# Patient Record
Sex: MALE | Race: WHITE | Employment: UNEMPLOYED | URBAN - METROPOLITAN AREA
[De-identification: names, ages, dates, MRNs, and addresses within clinical notes are randomized per-mention and may not be internally consistent; named-entity substitution may affect disease eponyms.]

---

## 2022-09-28 ENCOUNTER — TELEPHONE (OUTPATIENT)
Dept: INTERNAL MEDICINE | Age: 82
End: 2022-09-28

## 2022-09-28 NOTE — TELEPHONE ENCOUNTER
Specialty Medication Service    Date: 9/28/2022  Patient's Name: Candance Raider YOB: 1940            _____________________________________________________________________________________________    Corinn Bussing to patient  to schedule PharmD initial appointment for Specialty Medication Services. Scheduled for 9/30/22.     Chey Power PharmD [facility]  Ambulatory Clinical Pharmacist  Specialty Medication Services  Phone: [phone]    For Pharmacy [address] in place:  No  Recommendation Provided To: Patient/Caregiver: 1 via Telephone  Intervention Detail: Scheduled Appointment  Intervention Accepted By: Patient/Caregiver: 1  Time Spent (min): 15